# Patient Record
Sex: FEMALE | Race: WHITE | Employment: STUDENT | URBAN - NONMETROPOLITAN AREA
[De-identification: names, ages, dates, MRNs, and addresses within clinical notes are randomized per-mention and may not be internally consistent; named-entity substitution may affect disease eponyms.]

---
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
## 2023-01-12 ENCOUNTER — TELEPHONE (OUTPATIENT)
Dept: OTOLARYNGOLOGY | Facility: CLINIC | Age: 18
End: 2023-01-12
Payer: COMMERCIAL

## 2023-01-12 NOTE — TELEPHONE ENCOUNTER
Call placed to [facility] which is referring office and requested additional records, this is 2nd phone call. Will notify patient

## 2023-01-12 NOTE — TELEPHONE ENCOUNTER
Call placed to patient to inform that we have requested records x 2 from PCP office without success, message left requesting call back

## 2023-01-13 ENCOUNTER — TELEPHONE (OUTPATIENT)
Dept: OTOLARYNGOLOGY | Facility: CLINIC | Age: 18
End: 2023-01-13
Payer: COMMERCIAL

## 2023-01-13 NOTE — TELEPHONE ENCOUNTER
"  Caller: KIRILL REED     Relationship: SELF     Best call back number: [phone] (PT MOTHER)    PT RETURNING CALL TO [facility].   COULD NOT REACH OFFICE TO TRANSFER.   HUB CANNOT TELL PT WHY [facility] WAS CALLING UNLESS TELEPHONE ENCOUNTER STATES \"HUB TO READ\" PLEASE RETURN PT'S CALL WHEN AVAILABLE.  "

## 2023-01-16 ENCOUNTER — TELEPHONE (OUTPATIENT)
Dept: OTOLARYNGOLOGY | Facility: CLINIC | Age: 18
End: 2023-01-16
Payer: COMMERCIAL

## 2023-01-27 ENCOUNTER — TELEPHONE (OUTPATIENT)
Dept: OTOLARYNGOLOGY | Facility: CLINIC | Age: 18
End: 2023-01-27
Payer: COMMERCIAL

## 2023-01-27 NOTE — TELEPHONE ENCOUNTER
The pt's mother was returning my call that I made to the pt on Monday, 1/23/2023, about the miscommunication with appts. She stated the pt has not been seen anywhere else so she still needs an appt here. I will relay to the nurse and have her call to schedule an appt.

## 2023-02-06 ENCOUNTER — TELEPHONE (OUTPATIENT)
Dept: OTOLARYNGOLOGY | Facility: CLINIC | Age: 18
End: 2023-02-06
Payer: COMMERCIAL

## 2023-02-06 NOTE — TELEPHONE ENCOUNTER
Spoke with patient and explained that we need at least 1 one more epipsode of ear infections documented and if she could have those faxed to us we can schedule.

## 2023-02-28 ENCOUNTER — TELEPHONE (OUTPATIENT)
Dept: OTOLARYNGOLOGY | Facility: CLINIC | Age: 18
End: 2023-02-28
Payer: COMMERCIAL

## 2023-02-28 NOTE — TELEPHONE ENCOUNTER
Call placed to patient with no answer, message left informing patient of appointment on 4/26/2023 at 1030. requested call back to confirm, reminder mailed,

## 2023-05-01 ENCOUNTER — OFFICE VISIT (OUTPATIENT)
Dept: OTOLARYNGOLOGY | Facility: CLINIC | Age: 18
End: 2023-05-01
Payer: COMMERCIAL

## 2023-05-01 VITALS — HEIGHT: 67 IN | BODY MASS INDEX: 21.31 KG/M2 | TEMPERATURE: 98 F | WEIGHT: 135.8 LBS

## 2023-05-01 DIAGNOSIS — M26.623 BILATERAL TEMPOROMANDIBULAR JOINT PAIN: ICD-10-CM

## 2023-05-01 DIAGNOSIS — H92.03 OTALGIA OF BOTH EARS: ICD-10-CM

## 2023-05-01 DIAGNOSIS — H93.13 TINNITUS AURIUM, BILATERAL: Primary | ICD-10-CM

## 2023-05-01 PROCEDURE — 99203 OFFICE O/P NEW LOW 30 MIN: CPT | Performed by: EMERGENCY MEDICINE

## 2023-05-01 RX ORDER — FLUTICASONE PROPIONATE 50 MCG
2 SPRAY, SUSPENSION (ML) NASAL DAILY
Qty: 16 G | Refills: 11 | Status: SHIPPED | OUTPATIENT
Start: 2023-05-01

## 2023-05-01 RX ORDER — FLUTICASONE PROPIONATE 50 MCG
2 SPRAY, SUSPENSION (ML) NASAL AS NEEDED
COMMUNITY

## 2023-05-01 RX ORDER — LEVOCETIRIZINE DIHYDROCHLORIDE 5 MG/1
5 TABLET, FILM COATED ORAL EVERY EVENING
Qty: 30 TABLET | Refills: 3 | Status: SHIPPED | OUTPATIENT
Start: 2023-05-01

## 2023-05-01 NOTE — PROGRESS NOTES
KIN Fowler  [facility]  [address], [address]  [place] [address]  Fax [phone]  Phone [phone]      Visit Type: NEW PATIENT   Chief Complaint   Patient presents with   • Ear Fullness   • Earache   • Tinnitus        HPI  She complains of otalgia and ear pressure. The symptoms are localized to both ears. The patient has had mild to moderate symptoms. The symptoms have been relatively constant for the last several years. There have been no identified factors that aggravate the symptoms. The patient has been trying antihistamines and intranasal fluticasone with continued symptoms.    She has braces and is currently using rubber bands to adjust bite.  She reports clicking in her jaw when she eats.  She is unsure if she grinds her teeth.     History reviewed. No pertinent past medical history.    Past Surgical History:   Procedure Laterality Date   • DENTAL EXAMINATION UNDER ANESTHESIA         Family History: Her family history is not on file.     Social History: She  reports that she has never smoked. She has never used smokeless tobacco. She reports that she does not drink alcohol and does not use drugs.    Home Medications:  fluticasone and levocetirizine    Allergies:  She is allergic to cefprozil and clarithromycin.       Vital Signs:   Temp:  [98 °F (36.7 °C)] 98 °F (36.7 °C)  ENT Physical Exam  Constitutional  Appearance: patient appears well-developed, well-nourished and well-groomed,  Communication/Voice: communication appropriate for developmental age; vocal quality normal;  Head and Face  Appearance: head appears normal, face appears normal and face appears atraumatic;  Palpation: TMJ crepitus noted;  Salivary: glands normal;  Ear  Hearing: intact;  Auricles: right auricle normal; left auricle normal;  External Mastoids: right external mastoid normal; left external mastoid normal;  Ear Canals: right ear canal  normal; left ear canal normal;  Tympanic Membranes: right tympanic membrane normal; left tympanic membrane normal;  Nose  External Nose: nares patent bilaterally; external nose normal;  Oral Cavity/Oropharynx  Lips: normal;  Teeth: braces present;  Gums: gingiva normal;  Tongue: normal;  Oral mucosa: normal;  Hard palate: normal;  Neck  Neck: neck normal;  Respiratory  Inspection: breathing unlabored;  Cardiovascular  Inspection: extremities are warm and well perfused;  Lymphatic  Palpation: lymph nodes normal;     Tympanometry    Date/Time: 5/1/2023 10:56 AM  Performed by: Corina Richter APRN  Authorized by: Corina Richter APRN   Consent: Verbal consent obtained.  Consent given by: patient  Patient tolerance: patient tolerated the procedure well with no immediate complications  Comments: Type A bilaterally         Result Review    RESULTS REVIEW    I have reviewed the patients old records in the chart.     Assessment & Plan    Diagnoses and all orders for this visit:    1. Tinnitus aurium, bilateral (Primary)  -     Comprehensive Hearing Test; Future    2. Bilateral temporomandibular joint pain    3. Otalgia of both ears    Other orders  -     fluticasone (FLONASE) 50 MCG/ACT nasal spray; 2 sprays into the nostril(s) as directed by provider Daily.  Dispense: 16 g; Refill: 11  -     levocetirizine (XYZAL) 5 MG tablet; Take 1 tablet by mouth Every Evening.  Dispense: 30 tablet; Refill: 3  -     Tympanometry       For the best results, use nasal sprays every day. It may take a week to build up in the nasal lining and a few more weeks to improve the eustachian tube function.    Return in about 8 weeks (around 6/26/2023) for Follow up with KIN Beecrra, Follow up with Audiogram.      KIN Fowler   05/01/23  10:56 CDT

## 2024-09-21 ENCOUNTER — HOSPITAL ENCOUNTER (OUTPATIENT)
Dept: MRI IMAGING | Age: 19
End: 2024-09-21
Payer: COMMERCIAL

## 2024-09-21 DIAGNOSIS — R51.9 NONINTRACTABLE HEADACHE, UNSPECIFIED CHRONICITY PATTERN, UNSPECIFIED HEADACHE TYPE: ICD-10-CM

## 2024-09-21 PROCEDURE — A9577 INJ MULTIHANCE: HCPCS | Performed by: FAMILY MEDICINE

## 2024-09-21 PROCEDURE — 6360000004 HC RX CONTRAST MEDICATION: Performed by: FAMILY MEDICINE

## 2024-09-21 PROCEDURE — 70553 MRI BRAIN STEM W/O & W/DYE: CPT

## 2024-09-21 RX ADMIN — GADOBENATE DIMEGLUMINE 10 ML: 529 INJECTION, SOLUTION INTRAVENOUS at 08:08

## 2024-11-08 ENCOUNTER — HOSPITAL ENCOUNTER (OUTPATIENT)
Dept: ULTRASOUND IMAGING | Age: 19
Discharge: HOME | End: 2024-11-08
Payer: COMMERCIAL

## 2024-11-08 DIAGNOSIS — R74.8 ABNORMAL LEVELS OF OTHER SERUM ENZYMES: ICD-10-CM

## 2024-11-08 PROCEDURE — 76700 US EXAM ABDOM COMPLETE: CPT
